# Patient Record
Sex: FEMALE | Race: OTHER | NOT HISPANIC OR LATINO
[De-identification: names, ages, dates, MRNs, and addresses within clinical notes are randomized per-mention and may not be internally consistent; named-entity substitution may affect disease eponyms.]

---

## 2019-04-17 ENCOUNTER — APPOINTMENT (OUTPATIENT)
Dept: GASTROENTEROLOGY | Facility: CLINIC | Age: 49
End: 2019-04-17
Payer: COMMERCIAL

## 2019-04-17 VITALS
WEIGHT: 134 LBS | SYSTOLIC BLOOD PRESSURE: 120 MMHG | OXYGEN SATURATION: 99 % | HEIGHT: 67 IN | BODY MASS INDEX: 21.03 KG/M2 | HEART RATE: 88 BPM | TEMPERATURE: 98.4 F | DIASTOLIC BLOOD PRESSURE: 70 MMHG

## 2019-04-17 DIAGNOSIS — Z82.61 FAMILY HISTORY OF ARTHRITIS: ICD-10-CM

## 2019-04-17 DIAGNOSIS — Z80.1 FAMILY HISTORY OF MALIGNANT NEOPLASM OF TRACHEA, BRONCHUS AND LUNG: ICD-10-CM

## 2019-04-17 DIAGNOSIS — Z84.1 FAMILY HISTORY OF DISORDERS OF KIDNEY AND URETER: ICD-10-CM

## 2019-04-17 DIAGNOSIS — Z80.52 FAMILY HISTORY OF MALIGNANT NEOPLASM OF BLADDER: ICD-10-CM

## 2019-04-17 DIAGNOSIS — Z86.79 PERSONAL HISTORY OF OTHER DISEASES OF THE CIRCULATORY SYSTEM: ICD-10-CM

## 2019-04-17 DIAGNOSIS — Z80.0 FAMILY HISTORY OF MALIGNANT NEOPLASM OF DIGESTIVE ORGANS: ICD-10-CM

## 2019-04-17 DIAGNOSIS — Z82.49 FAMILY HISTORY OF ISCHEMIC HEART DISEASE AND OTHER DISEASES OF THE CIRCULATORY SYSTEM: ICD-10-CM

## 2019-04-17 DIAGNOSIS — R19.8 OTHER SPECIFIED SYMPTOMS AND SIGNS INVOLVING THE DIGESTIVE SYSTEM AND ABDOMEN: ICD-10-CM

## 2019-04-17 DIAGNOSIS — R19.5 OTHER FECAL ABNORMALITIES: ICD-10-CM

## 2019-04-17 DIAGNOSIS — R63.4 ABNORMAL WEIGHT LOSS: ICD-10-CM

## 2019-04-17 DIAGNOSIS — Z80.6 FAMILY HISTORY OF LEUKEMIA: ICD-10-CM

## 2019-04-17 PROCEDURE — 99205 OFFICE O/P NEW HI 60 MIN: CPT | Mod: 25

## 2019-04-17 PROCEDURE — 36415 COLL VENOUS BLD VENIPUNCTURE: CPT

## 2019-04-17 RX ORDER — BACILLUS COAGULANS/INULIN 1B-250 MG
CAPSULE ORAL
Refills: 0 | Status: ACTIVE | COMMUNITY

## 2019-04-17 RX ORDER — SACCHAROMYCES BOULARDII 50 MG
CAPSULE ORAL
Refills: 0 | Status: ACTIVE | COMMUNITY

## 2019-04-17 RX ORDER — SODIUM PICOSULFATE, MAGNESIUM OXIDE, AND ANHYDROUS CITRIC ACID 10; 3.5; 12 MG/160ML; G/160ML; G/160ML
10-3.5-12 MG-GM LIQUID ORAL
Qty: 1 | Refills: 0 | Status: ACTIVE | COMMUNITY
Start: 2019-04-17 | End: 1900-01-01

## 2019-04-17 RX ORDER — HYOSCYAMINE SULFATE 0.15 MG
TABLET ORAL
Refills: 0 | Status: ACTIVE | COMMUNITY

## 2019-04-17 NOTE — PHYSICAL EXAM
[General Appearance - Alert] : alert [General Appearance - In No Acute Distress] : in no acute distress [Neck Appearance] : the appearance of the neck was normal [Neck Cervical Mass (___cm)] : no neck mass was observed [Jugular Venous Distention Increased] : there was no jugular-venous distention [Thyroid Diffuse Enlargement] : the thyroid was not enlarged [Thyroid Nodule] : there were no palpable thyroid nodules [Heart Rate And Rhythm] : heart rate was normal and rhythm regular [Auscultation Breath Sounds / Voice Sounds] : lungs were clear to auscultation bilaterally [Heart Sounds] : normal S1 and S2 [Heart Sounds Gallop] : no gallops [Murmurs] : no murmurs [Heart Sounds Pericardial Friction Rub] : no pericardial rub [Bowel Sounds] : normal bowel sounds [Edema] : there was no peripheral edema [Abdomen Soft] : soft [] : no hepato-splenomegaly [Abdomen Tenderness] : non-tender [Abdomen Mass (___ Cm)] : no abdominal mass palpated [No CVA Tenderness] : no ~M costovertebral angle tenderness [No Spinal Tenderness] : no spinal tenderness [Oriented To Time, Place, And Person] : oriented to person, place, and time [Impaired Insight] : insight and judgment were intact [Affect] : the affect was normal

## 2019-04-18 ENCOUNTER — OTHER (OUTPATIENT)
Age: 49
End: 2019-04-18

## 2019-04-19 NOTE — REVIEW OF SYSTEMS
[Recent Weight Loss (___ Lbs)] : recent [unfilled] ~Ulb weight loss [As Noted in HPI] : as noted in HPI [Anxiety] : anxiety [Negative] : Heme/Lymph [FreeTextEntry2] : weak, body aches

## 2019-04-19 NOTE — REASON FOR VISIT
[Initial Evaluation] : an initial evaluation [FreeTextEntry1] : IBS, abd pain, incomplete BMs, diarrhea, weight loss

## 2019-04-19 NOTE — ASSESSMENT
[FreeTextEntry1] : Patient with a diagnosis of IBS who complains of left-sided abdominal pain, incomplete bowel movements, occasional loose stools, and weight loss.\par \par Bloodwork was sent for CBC, chem-pack, TSH, celiac markers, celiac H. LA testing, sedimentation rate, C-reactive protein, iron studies, B12, folate.\par \par A colonoscopy has been scheduled. The risks, benefits, alternatives, and limitations of the procedure, including the possibility of missed lesions, were explained. The patient will require cardiac evaluation and anesthesia clearance prior to the procedures given the history of SVT and ablation.

## 2019-04-19 NOTE — CONSULT LETTER
[FreeTextEntry1] : Dear Dr. Amy Bernstein and Dr. Giselle Randhawa,\par \par I had the pleasure of seeing your patient MARY ALICE BUENO in the office today.  My office note is attached.\par \par Thank you very much for allowing me to participate in the care of your patient.\par \par Sincerely,\par \par Eric Alba M.D., FACG, FACP\par Director, Celiac Program at [facility]\par  of Medicine\par [employer] [facility] [place] Practice Director,\par [facility] - Gastroenterology/Internal Medicine at [place]\[facility] [address]\[place] [address]\par Tel: [phone]\par Email: [email or web address]

## 2019-04-19 NOTE — HISTORY OF PRESENT ILLNESS
[FreeTextEntry1] : The patient is a 48-year-old woman who was diagnosed with IBS in 2001 after delivery of her child. At that time she had diarrhea and cramping. 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 She was treated with probiotics and felt better for 4 years.  She then developed diarrhea in September 2018 after her dog had C. difficile. She saw GI red stool tests that were negative for infection and calprotectin. She had EGD on February 21, 2019 which revealed mild gastritis and fundic gland polyps. Blood work from January 19, 2019 showed negative celiac labs although the patient's IgA was less than 5 but showed a positive ASCA IgG and calcium oxylate crystals in the urine. Patient had MRI of the abdomen and pelvis on March 20, 2019 which was normal.\par \par The patient now complains of left-sided abdominal pain which radiates to the back. This occurs mostly in the mornings and is almost daily. She feels like her bowel movements aren't complete and she has to go a few times. She gets occasional loose stools. She denies melena or bright red blood per rectum. At night, the patient gets gassy and has belching. She denies heartburn or dysphagia. The patient has lost 40 pounds since September which he attributes to eating a very limited diet.\par \par  The patient has not been hospitalized in the past year. She has a history of an ablation for SVT in 2004.

## 2019-04-22 LAB
ALBUMIN SERPL ELPH-MCNC: 4.5 G/DL
ALP BLD-CCNC: 76 U/L
ALT SERPL-CCNC: 15 U/L
ANION GAP SERPL CALC-SCNC: 12 MMOL/L
AST SERPL-CCNC: 19 U/L
BASOPHILS # BLD AUTO: 0.03 K/UL
BASOPHILS NFR BLD AUTO: 0.5 %
BILIRUB SERPL-MCNC: 0.4 MG/DL
BUN SERPL-MCNC: 15 MG/DL
CALCIUM SERPL-MCNC: 9.8 MG/DL
CHLORIDE SERPL-SCNC: 102 MMOL/L
CO2 SERPL-SCNC: 28 MMOL/L
CREAT SERPL-MCNC: 0.73 MG/DL
CRP SERPL-MCNC: 0.82 MG/DL
DEPRECATED KAPPA LC FREE/LAMBDA SER: 1.23 RATIO
ENDOMYSIUM IGA SER QL: NEGATIVE
ENDOMYSIUM IGA TITR SER: NORMAL
EOSINOPHIL # BLD AUTO: 0.06 K/UL
EOSINOPHIL NFR BLD AUTO: 1 %
ERYTHROCYTE [SEDIMENTATION RATE] IN BLOOD BY WESTERGREN METHOD: 35 MM/HR
FERRITIN SERPL-MCNC: 53 NG/ML
FOLATE SERPL-MCNC: >20 NG/ML
GLIADIN IGA SER QL: <5 UNITS
GLIADIN IGG SER QL: <5 UNITS
GLIADIN PEPTIDE IGA SER-ACNC: NEGATIVE
GLIADIN PEPTIDE IGG SER-ACNC: NEGATIVE
GLUCOSE SERPL-MCNC: 79 MG/DL
HCT VFR BLD CALC: 42.2 %
HGB BLD-MCNC: 12.9 G/DL
IGA SER QL IEP: <2 MG/DL
IGA SER QL IEP: <2 MG/DL
IGG SER QL IEP: 1683 MG/DL
IGM SER QL IEP: 119 MG/DL
IMM GRANULOCYTES NFR BLD AUTO: 0 %
IRON SATN MFR SERPL: 10 %
IRON SERPL-MCNC: 38 UG/DL
KAPPA LC CSF-MCNC: 1.59 MG/DL
KAPPA LC SERPL-MCNC: 1.95 MG/DL
LYMPHOCYTES # BLD AUTO: 1.33 K/UL
LYMPHOCYTES NFR BLD AUTO: 22.6 %
MAN DIFF?: NORMAL
MCHC RBC-ENTMCNC: 29.6 PG
MCHC RBC-ENTMCNC: 30.6 GM/DL
MCV RBC AUTO: 96.8 FL
MONOCYTES # BLD AUTO: 0.54 K/UL
MONOCYTES NFR BLD AUTO: 9.2 %
NEUTROPHILS # BLD AUTO: 3.93 K/UL
NEUTROPHILS NFR BLD AUTO: 66.7 %
PLATELET # BLD AUTO: 242 K/UL
POTASSIUM SERPL-SCNC: 3.9 MMOL/L
PROT SERPL-MCNC: 8 G/DL
RBC # BLD: 4.36 M/UL
RBC # FLD: 13.3 %
SODIUM SERPL-SCNC: 141 MMOL/L
TIBC SERPL-MCNC: 383 UG/DL
TSH SERPL-ACNC: 0.44 UIU/ML
TTG IGA SER IA-ACNC: <1.2 U/ML
TTG IGA SER-ACNC: NEGATIVE
TTG IGG SER IA-ACNC: 1.7 U/ML
TTG IGG SER IA-ACNC: NEGATIVE
UIBC SERPL-MCNC: 345 UG/DL
VIT B12 SERPL-MCNC: >2000 PG/ML
WBC # FLD AUTO: 5.89 K/UL

## 2019-04-24 LAB — CELIAC GENETICS PROMETHEUS: NORMAL

## 2019-05-17 ENCOUNTER — APPOINTMENT (OUTPATIENT)
Dept: ELECTROPHYSIOLOGY | Facility: CLINIC | Age: 49
End: 2019-05-17

## 2019-06-14 ENCOUNTER — APPOINTMENT (OUTPATIENT)
Dept: ENDOCRINOLOGY | Facility: CLINIC | Age: 49
End: 2019-06-14

## 2019-11-12 ENCOUNTER — APPOINTMENT (OUTPATIENT)
Dept: FAMILY MEDICINE | Facility: CLINIC | Age: 49
End: 2019-11-12
Payer: COMMERCIAL

## 2019-11-12 VITALS
HEIGHT: 67 IN | DIASTOLIC BLOOD PRESSURE: 76 MMHG | WEIGHT: 119 LBS | OXYGEN SATURATION: 99 % | BODY MASS INDEX: 18.68 KG/M2 | RESPIRATION RATE: 16 BRPM | SYSTOLIC BLOOD PRESSURE: 122 MMHG | HEART RATE: 87 BPM

## 2019-11-12 DIAGNOSIS — Z87.898 PERSONAL HISTORY OF OTHER SPECIFIED CONDITIONS: ICD-10-CM

## 2019-11-12 DIAGNOSIS — R10.9 UNSPECIFIED ABDOMINAL PAIN: ICD-10-CM

## 2019-11-12 PROCEDURE — 99202 OFFICE O/P NEW SF 15 MIN: CPT

## 2019-11-12 NOTE — HISTORY OF PRESENT ILLNESS
[FreeTextEntry8] : 49 year old female here with complaints of side of her back. intermittent, for a few months, not current. Patients active medications, allergies and issues were all reviewed with the patient at time of visit.\par

## 2019-11-12 NOTE — ASSESSMENT
[FreeTextEntry1] : iga deficiency\par sees immunologist\par \par back pain\par intermittent, comes and goes, can be severe, on left back, not current\par can not be reproducible\par already had sonogram of her kidney\par will do xray\par

## 2019-11-12 NOTE — HEALTH RISK ASSESSMENT
[] : No [No] : No [No falls in past year] : Patient reported no falls in the past year [0] : 1) Little interest or pleasure doing things: Not at all (0) [[id number]] : 0

## 2019-12-09 ENCOUNTER — OTHER (OUTPATIENT)
Age: 49
End: 2019-12-09

## 2020-01-28 ENCOUNTER — APPOINTMENT (OUTPATIENT)
Dept: ENDOCRINOLOGY | Facility: CLINIC | Age: 50
End: 2020-01-28
Payer: COMMERCIAL

## 2020-01-28 VITALS
DIASTOLIC BLOOD PRESSURE: 60 MMHG | RESPIRATION RATE: 16 BRPM | OXYGEN SATURATION: 98 % | SYSTOLIC BLOOD PRESSURE: 102 MMHG | HEIGHT: 67 IN | HEART RATE: 91 BPM | BODY MASS INDEX: 18.36 KG/M2 | WEIGHT: 117 LBS

## 2020-01-28 DIAGNOSIS — Z13.820 ENCOUNTER FOR SCREENING FOR OSTEOPOROSIS: ICD-10-CM

## 2020-01-28 PROCEDURE — 99244 OFF/OP CNSLTJ NEW/EST MOD 40: CPT | Mod: 25

## 2020-01-28 PROCEDURE — 36415 COLL VENOUS BLD VENIPUNCTURE: CPT

## 2020-01-28 NOTE — ASSESSMENT
[FreeTextEntry1] : Subclinical Graves disease\par - repeat thyroid panel, antibodies\par - if TSH still in the suppressed range, would advise on therapy\par - Available therapeutic approaches to hyperthyroidism, including antithyroidal medications, I-131 therapy, and total thyroidectomy were reviewed. \par Risk and benefits of each approach were extensively discussed (including, but not limited to permanent hypothyroidism post-thyroidectomy or I-131 therapy, and increased risk of life-threatening neutropenia or liver failure on thionamides).\par - thyroid US\par - DXA 3 sites\par - calcium 500 mg qd- bid, add extra OTC vitamin D3 2,000 IU/day\par - continue weight-bearing exercises\par RTC next available for results\par

## 2020-01-28 NOTE — HISTORY OF PRESENT ILLNESS
[FreeTextEntry1] : 49 year female referred for thyroid evaluation.\par Mrs Vasquez was diagnosed with a subclinical hyperthyroidism several years ago. She was previously under care of Alem Cochran and Ana M. Per patient, they thought that she had an underlying Graves disease, but did not receive any therapy because her T3 and T4 were normal. She recalls that  her thyroid sonogram was normal at that time. She also states that her TSH fluctuates and frequently goes back to the normal range.\par Denies family h/o thyroid cancer or history of radiation exposure to head and neck area in a childhood. Her sister is suffering from an undefined thyroid disorder.\par Patient complains of a 50- lbs weight loss (related to her "intestinal issues", but she's not sure exactly), fatigue, occasional palpitations, diarrhea, skin/nail changes, hair loss, periorbital edema, anxiety. She underwent a RFA for SVT in 2007.\par Last labs from 04/19- TSH- 0.44\par  1/19/19- TSH < 0.01, FT4- 1.5, T3- 137\par TSI- 181

## 2020-01-28 NOTE — CONSULT LETTER
[Dear  ___] : Dear  [unfilled], [Sincerely,] : Sincerely, [FreeTextEntry1] : Thank you for referring  Ms. MARY ALICE BUENO to me for evaluation and treatment. Please, see attached consultation note. As always, if there are specific questions you would like to discuss, please feel free to contact me.\par Thank you for the courtesy of this evaluation.\par  [FreeTextEntry3] : Wilbert Pompa MD, FACE, EC[facility]\par

## 2020-01-31 LAB
25(OH)D3 SERPL-MCNC: 33.7 NG/ML
ALBUMIN SERPL ELPH-MCNC: 4.6 G/DL
ALP BLD-CCNC: 65 U/L
ALT SERPL-CCNC: 21 U/L
ANION GAP SERPL CALC-SCNC: 12 MMOL/L
AST SERPL-CCNC: 21 U/L
BASOPHILS # BLD AUTO: 0.02 K/UL
BASOPHILS NFR BLD AUTO: 0.4 %
BILIRUB DIRECT SERPL-MCNC: 0.1 MG/DL
BILIRUB INDIRECT SERPL-MCNC: 0.3 MG/DL
BILIRUB SERPL-MCNC: 0.4 MG/DL
BUN SERPL-MCNC: 20 MG/DL
CALCIUM SERPL-MCNC: 9.6 MG/DL
CALCIUM SERPL-MCNC: 9.6 MG/DL
CHLORIDE SERPL-SCNC: 103 MMOL/L
CO2 SERPL-SCNC: 26 MMOL/L
CREAT SERPL-MCNC: 0.75 MG/DL
EOSINOPHIL # BLD AUTO: 0.05 K/UL
EOSINOPHIL NFR BLD AUTO: 1.1 %
GLUCOSE SERPL-MCNC: 71 MG/DL
HCT VFR BLD CALC: 44.8 %
HGB BLD-MCNC: 13.7 G/DL
IMM GRANULOCYTES NFR BLD AUTO: 0.2 %
LYMPHOCYTES # BLD AUTO: 1.53 K/UL
LYMPHOCYTES NFR BLD AUTO: 32.5 %
MAN DIFF?: NORMAL
MCHC RBC-ENTMCNC: 29.6 PG
MCHC RBC-ENTMCNC: 30.6 GM/DL
MCV RBC AUTO: 96.8 FL
MONOCYTES # BLD AUTO: 0.32 K/UL
MONOCYTES NFR BLD AUTO: 6.8 %
NEUTROPHILS # BLD AUTO: 2.78 K/UL
NEUTROPHILS NFR BLD AUTO: 59 %
PARATHYROID HORMONE INTACT: 33 PG/ML
PLATELET # BLD AUTO: 193 K/UL
POTASSIUM SERPL-SCNC: 4.2 MMOL/L
PROT SERPL-MCNC: 7.8 G/DL
RBC # BLD: 4.63 M/UL
RBC # FLD: 12.4 %
SODIUM SERPL-SCNC: 142 MMOL/L
T3 SERPL-MCNC: 109 NG/DL
T4 FREE SERPL-MCNC: 1.1 NG/DL
THYROGLOB AB SERPL-ACNC: 84.3 IU/ML
THYROPEROXIDASE AB SERPL IA-ACNC: 102 IU/ML
TSH RECEPTOR AB: 1.42 IU/L
TSH SERPL-ACNC: 0.04 UIU/ML
TSI ACT/NOR SER: 0.92 IU/L
WBC # FLD AUTO: 4.71 K/UL

## 2020-02-14 ENCOUNTER — APPOINTMENT (OUTPATIENT)
Dept: ENDOCRINOLOGY | Facility: CLINIC | Age: 50
End: 2020-02-14
Payer: COMMERCIAL

## 2020-02-14 VITALS
HEIGHT: 67 IN | DIASTOLIC BLOOD PRESSURE: 60 MMHG | SYSTOLIC BLOOD PRESSURE: 100 MMHG | BODY MASS INDEX: 18.36 KG/M2 | RESPIRATION RATE: 16 BRPM | OXYGEN SATURATION: 99 % | HEART RATE: 79 BPM | WEIGHT: 117 LBS

## 2020-02-14 DIAGNOSIS — E05.90 THYROTOXICOSIS, UNSPECIFIED W/OUT THYROTOXIC CRISIS OR STORM: ICD-10-CM

## 2020-02-14 PROCEDURE — 99214 OFFICE O/P EST MOD 30 MIN: CPT

## 2020-02-14 RX ORDER — METHIMAZOLE 5 MG/1
5 TABLET ORAL DAILY
Qty: 30 | Refills: 5 | Status: ACTIVE | COMMUNITY
Start: 2020-02-14 | End: 1900-01-01

## 2020-02-14 NOTE — ASSESSMENT
[FreeTextEntry1] : Subclinical Graves disease\par - Available therapeutic approaches to hyperthyroidism, including antithyroidal medications, I-131 therapy, and total thyroidectomy were reviewed. \par Risk and benefits of each approach were extensively discussed (including, but not limited to permanent hypothyroidism post-thyroidectomy or I-131 therapy, and increased risk of life-threatening neutropenia or liver failure on thionamides).\par - mutually agreed to start on MMI 5 mg qd\par - rpt  thyroid US in 6 mos (08/20)\par - calcium 500 mg qd- bid, add extra OTC vitamin D3 2,000 IU/day\par - continue weight-bearing exercises\par RTC 6-8 weeks\par

## 2020-02-14 NOTE — HISTORY OF PRESENT ILLNESS
[FreeTextEntry1] : 49 year female f/u for hyperthyroidism\par \par *** Feb 14, 2020 ***\par \par TSH- 0.04\par + TSI ab\par \par Thyr US (2/5/20)- RUP  0.5x0.3x0.4 hypo solid.\par DXA (2/5/20)- LS (+1.8), FN (+0.1), radius 33% (0.0)\par \par \par HPI:\par Mrs Vasquez was diagnosed with a subclinical hyperthyroidism several years ago. She was previously under care of Alem Cochran and Ana M. Per patient, they thought that she had an underlying Graves disease, but did not receive any therapy because her T3 and T4 were normal. She recalls that  her thyroid sonogram was normal at that time. She also states that her TSH fluctuates and frequently goes back to the normal range.\par Denies family h/o thyroid cancer or history of radiation exposure to head and neck area in a childhood. Her sister is suffering from an undefined thyroid disorder.\par Patient complains of a 50- lbs weight loss (related to her "intestinal issues", but she's not sure exactly), fatigue, occasional palpitations, diarrhea, skin/nail changes, hair loss, periorbital edema, anxiety. She underwent a RFA for SVT in 2007.\par Last labs from 04/19- TSH- 0.44\par  1/19/19- TSH < 0.01, FT4- 1.5, T3- 137\par TSI- 181\par neg ttg ab, antigliadin ab

## 2020-06-23 ENCOUNTER — APPOINTMENT (OUTPATIENT)
Dept: ENDOCRINOLOGY | Facility: CLINIC | Age: 50
End: 2020-06-23

## 2021-04-04 ENCOUNTER — TRANSCRIPTION ENCOUNTER (OUTPATIENT)
Age: 51
End: 2021-04-04

## 2022-08-24 ENCOUNTER — APPOINTMENT (OUTPATIENT)
Dept: CT IMAGING | Facility: CLINIC | Age: 52
End: 2022-08-24

## 2023-03-07 NOTE — END OF VISIT
[>50% of Time Spent on Counseling and Coordination of Care for  ___] : Greater than 50% of the encounter time was spent on counseling and coordination of care for [unfilled] [Time Spent: ___ minutes] : I have spent [unfilled] minutes of face to face time with the patient Nsaids Counseling: NSAID Counseling: I discussed with the patient that NSAIDs should be taken with food. Prolonged use of NSAIDs can result in the development of stomach ulcers.  Patient advised to stop taking NSAIDs if abdominal pain occurs.  The patient verbalized understanding of the proper use and possible adverse effects of NSAIDs.  All of the patient's questions and concerns were addressed.

## 2023-06-04 ENCOUNTER — NON-APPOINTMENT (OUTPATIENT)
Age: 53
End: 2023-06-04

## 2023-12-28 ENCOUNTER — APPOINTMENT (OUTPATIENT)
Dept: ORTHOPEDIC SURGERY | Facility: CLINIC | Age: 53
End: 2023-12-28
Payer: COMMERCIAL

## 2023-12-28 VITALS — BODY MASS INDEX: 22.13 KG/M2 | HEIGHT: 67 IN | WEIGHT: 141 LBS

## 2023-12-28 DIAGNOSIS — M25.552 PAIN IN LEFT HIP: ICD-10-CM

## 2023-12-28 DIAGNOSIS — M54.42 LUMBAGO WITH SCIATICA, LEFT SIDE: ICD-10-CM

## 2023-12-28 PROCEDURE — 72100 X-RAY EXAM L-S SPINE 2/3 VWS: CPT

## 2023-12-28 PROCEDURE — 72170 X-RAY EXAM OF PELVIS: CPT

## 2023-12-28 PROCEDURE — 99204 OFFICE O/P NEW MOD 45 MIN: CPT

## 2023-12-28 RX ORDER — MELOXICAM 15 MG/1
15 TABLET ORAL
Qty: 30 | Refills: 2 | Status: ACTIVE | COMMUNITY
Start: 2023-12-28 | End: 1900-01-01

## 2023-12-28 NOTE — ASSESSMENT
[FreeTextEntry1] : 53 year F WITH MODERATE LT HIP PAIN. H/O LUMBAR ISSUES. PAIN IS SOMETIMES IN THE GROIN AND TO THE LATERAL ASPECT OF THE HIP AND RADIATES DOWN THE LLE TO THE CALF. PAIN WORSENS WITH PROLONGED STANDING AND SITTING TO STAND. PAIN IS AFFECTING FUNCTIONAL ACTIVITIES, GETTING IN AND OUT OF CARS. XRAYS REVIEWED WITH LUMBAR OA, HIP XRAYS REVIEWED AS NORMAL. TREATMENT OPTIONS REVIEWED. LUMBAR PT RX. IF SYMPTOMS PERSIST WILL ORDER LUMBAR MRI AND FOLLOW UP WITH PAIN MANAGEMENT. QUESTIONS ANSWERED.MOBIC RX.   WILL ORDER LT HIP MRI TO EVAL FOR

## 2023-12-28 NOTE — HISTORY OF PRESENT ILLNESS
[Lower back] : lower back [Gradual] : gradual [8] : 8 [2] : 2 [Localized] : localized [Radiating] : radiating [Household chores] : household chores [Rest] : rest [Sitting] : sitting [de-identified] : 12/28/2023 PT PRESENTS HERE TODAY WITH LEFT HIP/BACK PAIN SINCE AUGUST 2023 WITHOUT INJURY. PAIN SHOOTS FROM THE LEFT SIDE OF THE BACK/BUTTOCK AND SHOOTS INTO THE BACK AND DOWN THE LEFT THIGH INTO THE LOWER LEG.  NO N/T.  SHE SAW DR MONTERO WHERE AN MRI L-SPINE WAS ORDERED.  SHE HAS TRIED ACUPUNCTURE.  [] : no [FreeTextEntry5] : NO INJURY  [FreeTextEntry6] : LEFT LEG [de-identified] : MRI OF THE LUMBAR SPINE DONE AT [facility] [de-identified] : NOTHING

## 2024-02-21 ENCOUNTER — APPOINTMENT (OUTPATIENT)
Dept: FAMILY MEDICINE | Facility: CLINIC | Age: 54
End: 2024-02-21
Payer: COMMERCIAL

## 2024-02-21 VITALS
RESPIRATION RATE: 16 BRPM | WEIGHT: 142 LBS | BODY MASS INDEX: 22.29 KG/M2 | HEIGHT: 67 IN | DIASTOLIC BLOOD PRESSURE: 76 MMHG | SYSTOLIC BLOOD PRESSURE: 133 MMHG | HEART RATE: 79 BPM | OXYGEN SATURATION: 100 % | TEMPERATURE: 98.6 F

## 2024-02-21 DIAGNOSIS — E05.00 THYROTOXICOSIS WITH DIFFUSE GOITER W/OUT THYROTOXIC CRISIS OR STORM: ICD-10-CM

## 2024-02-21 DIAGNOSIS — Z00.00 ENCOUNTER FOR GENERAL ADULT MEDICAL EXAMINATION W/OUT ABNORMAL FINDINGS: ICD-10-CM

## 2024-02-21 DIAGNOSIS — D80.2 SELECTIVE DEFICIENCY OF IMMUNOGLOBULIN A [IGA]: ICD-10-CM

## 2024-02-21 DIAGNOSIS — Z86.16 PERSONAL HISTORY OF COVID-19: ICD-10-CM

## 2024-02-21 DIAGNOSIS — F41.9 ANXIETY DISORDER, UNSPECIFIED: ICD-10-CM

## 2024-02-21 DIAGNOSIS — K58.9 IRRITABLE BOWEL SYNDROME W/OUT DIARRHEA: ICD-10-CM

## 2024-02-21 PROCEDURE — 99203 OFFICE O/P NEW LOW 30 MIN: CPT

## 2024-02-21 RX ORDER — ATENOLOL 25 MG/1
25 TABLET ORAL
Refills: 0 | Status: ACTIVE | COMMUNITY

## 2024-07-09 ENCOUNTER — APPOINTMENT (OUTPATIENT)
Dept: FAMILY MEDICINE | Facility: CLINIC | Age: 54
End: 2024-07-09

## 2024-08-26 ENCOUNTER — APPOINTMENT (OUTPATIENT)
Dept: FAMILY MEDICINE | Facility: CLINIC | Age: 54
End: 2024-08-26

## 2024-09-17 ENCOUNTER — APPOINTMENT (OUTPATIENT)
Dept: FAMILY MEDICINE | Facility: CLINIC | Age: 54
End: 2024-09-17

## 2024-10-15 ENCOUNTER — APPOINTMENT (OUTPATIENT)
Dept: FAMILY MEDICINE | Facility: CLINIC | Age: 54
End: 2024-10-15
Payer: COMMERCIAL

## 2024-10-15 VITALS
RESPIRATION RATE: 16 BRPM | HEART RATE: 94 BPM | TEMPERATURE: 97.4 F | SYSTOLIC BLOOD PRESSURE: 138 MMHG | OXYGEN SATURATION: 99 % | BODY MASS INDEX: 23.7 KG/M2 | DIASTOLIC BLOOD PRESSURE: 78 MMHG | WEIGHT: 151 LBS | HEIGHT: 67 IN

## 2024-10-15 DIAGNOSIS — F41.9 ANXIETY DISORDER, UNSPECIFIED: ICD-10-CM

## 2024-10-15 DIAGNOSIS — E05.00 THYROTOXICOSIS WITH DIFFUSE GOITER W/OUT THYROTOXIC CRISIS OR STORM: ICD-10-CM

## 2024-10-15 PROCEDURE — 99213 OFFICE O/P EST LOW 20 MIN: CPT

## 2024-10-22 ENCOUNTER — APPOINTMENT (OUTPATIENT)
Dept: GASTROENTEROLOGY | Facility: CLINIC | Age: 54
End: 2024-10-22

## 2024-10-25 ENCOUNTER — APPOINTMENT (OUTPATIENT)
Dept: GASTROENTEROLOGY | Facility: CLINIC | Age: 54
End: 2024-10-25

## 2024-10-27 LAB — BACTERIA STL CULT: NORMAL

## 2024-11-22 ENCOUNTER — APPOINTMENT (OUTPATIENT)
Dept: GASTROENTEROLOGY | Facility: CLINIC | Age: 54
End: 2024-11-22
Payer: COMMERCIAL

## 2024-11-22 VITALS
BODY MASS INDEX: 23.19 KG/M2 | HEART RATE: 93 BPM | HEIGHT: 68 IN | WEIGHT: 153 LBS | SYSTOLIC BLOOD PRESSURE: 127 MMHG | OXYGEN SATURATION: 99 % | DIASTOLIC BLOOD PRESSURE: 79 MMHG

## 2024-11-22 DIAGNOSIS — K80.50 CALCULUS OF BILE DUCT W/OUT CHOLANGITIS OR CHOLECYSTITIS W/OUT OBSTRUCTION: ICD-10-CM

## 2024-11-22 DIAGNOSIS — R19.8 OTHER SPECIFIED SYMPTOMS AND SIGNS INVOLVING THE DIGESTIVE SYSTEM AND ABDOMEN: ICD-10-CM

## 2024-11-22 DIAGNOSIS — R19.5 OTHER FECAL ABNORMALITIES: ICD-10-CM

## 2024-11-22 DIAGNOSIS — K58.9 IRRITABLE BOWEL SYNDROME, UNSPECIFIED: ICD-10-CM

## 2024-11-22 PROCEDURE — 99204 OFFICE O/P NEW MOD 45 MIN: CPT

## 2024-11-22 RX ORDER — DICYCLOMINE HYDROCHLORIDE 10 MG/5ML
10 SOLUTION ORAL
Qty: 473 | Refills: 0 | Status: ACTIVE | COMMUNITY
Start: 2024-11-22 | End: 1900-01-01

## 2024-11-23 PROBLEM — K80.50 CHOLEDOCHOLITHIASIS: Status: ACTIVE | Noted: 2024-11-22

## 2024-12-17 ENCOUNTER — APPOINTMENT (OUTPATIENT)
Dept: ORTHOPEDIC SURGERY | Facility: CLINIC | Age: 54
End: 2024-12-17

## 2024-12-23 ENCOUNTER — APPOINTMENT (OUTPATIENT)
Dept: GASTROENTEROLOGY | Facility: CLINIC | Age: 54
End: 2024-12-23

## 2025-01-13 ENCOUNTER — APPOINTMENT (OUTPATIENT)
Dept: ORTHOPEDIC SURGERY | Facility: CLINIC | Age: 55
End: 2025-01-13

## 2025-01-24 ENCOUNTER — APPOINTMENT (OUTPATIENT)
Dept: FAMILY MEDICINE | Facility: CLINIC | Age: 55
End: 2025-01-24
Payer: COMMERCIAL

## 2025-01-24 DIAGNOSIS — R39.9 UNSPECIFIED SYMPTOMS AND SIGNS INVOLVING THE GENITOURINARY SYSTEM: ICD-10-CM

## 2025-01-24 PROCEDURE — 99213 OFFICE O/P EST LOW 20 MIN: CPT | Mod: 95

## 2025-01-24 RX ORDER — NITROFURANTOIN (MONOHYDRATE/MACROCRYSTALS) 25; 75 MG/1; MG/1
100 CAPSULE ORAL
Qty: 14 | Refills: 0 | Status: ACTIVE | COMMUNITY
Start: 2025-01-24 | End: 1900-01-01

## 2025-01-27 LAB
APPEARANCE: CLEAR
BACTERIA: NEGATIVE /HPF
BILIRUBIN URINE: NEGATIVE
BLOOD URINE: ABNORMAL
CAST: 0 /LPF
COLOR: YELLOW
EPITHELIAL CELLS: 0 /HPF
GLUCOSE QUALITATIVE U: NEGATIVE MG/DL
KETONES URINE: NEGATIVE MG/DL
LEUKOCYTE ESTERASE URINE: ABNORMAL
MICROSCOPIC-UA: NORMAL
NITRITE URINE: NEGATIVE
PH URINE: 7.5
PROTEIN URINE: NEGATIVE MG/DL
RED BLOOD CELLS URINE: 0 /HPF
REVIEW: NORMAL
SPECIFIC GRAVITY URINE: 1.01
UROBILINOGEN URINE: 0.2 MG/DL
WHITE BLOOD CELLS URINE: 0 /HPF

## 2025-01-28 LAB — BACTERIA UR CULT: NORMAL

## 2025-04-18 ENCOUNTER — APPOINTMENT (OUTPATIENT)
Dept: ORTHOPEDIC SURGERY | Facility: CLINIC | Age: 55
End: 2025-04-18